# Patient Record
Sex: FEMALE | Race: WHITE | URBAN - METROPOLITAN AREA
[De-identification: names, ages, dates, MRNs, and addresses within clinical notes are randomized per-mention and may not be internally consistent; named-entity substitution may affect disease eponyms.]

---
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
## 2019-01-05 ENCOUNTER — APPOINTMENT (OUTPATIENT)
Dept: URGENT CARE | Age: 19
End: 2019-01-05

## 2019-01-06 ENCOUNTER — WALK IN (OUTPATIENT)
Dept: URGENT CARE | Age: 19
End: 2019-01-06

## 2019-01-06 ENCOUNTER — TELEPHONE (OUTPATIENT)
Dept: SCHEDULING | Age: 19
End: 2019-01-06

## 2019-01-06 VITALS — TEMPERATURE: 98.6 F

## 2019-01-06 DIAGNOSIS — J03.90 ACUTE TONSILLITIS, UNSPECIFIED ETIOLOGY: Primary | ICD-10-CM

## 2019-01-06 PROCEDURE — 99214 OFFICE O/P EST MOD 30 MIN: CPT | Performed by: NURSE PRACTITIONER

## 2019-01-06 RX ORDER — AMOXICILLIN 875 MG/1
875 TABLET, COATED ORAL 2 TIMES DAILY
Qty: 20 TABLET | Refills: 0 | Status: SHIPPED | OUTPATIENT
Start: 2019-01-06 | End: 2019-01-16

## 2019-01-06 ASSESSMENT — ENCOUNTER SYMPTOMS
HEMATOLOGIC/LYMPHATIC NEGATIVE: 1
EYES NEGATIVE: 1
FATIGUE: 1
ALLERGIC/IMMUNOLOGIC NEGATIVE: 1
PSYCHIATRIC NEGATIVE: 1
RESPIRATORY NEGATIVE: 1
SORE THROAT: 1
GASTROINTESTINAL NEGATIVE: 1
NEUROLOGICAL NEGATIVE: 1

## 2019-12-30 ENCOUNTER — WALK IN (OUTPATIENT)
Dept: URGENT CARE | Age: 19
End: 2019-12-30

## 2019-12-30 ENCOUNTER — TELEPHONE (OUTPATIENT)
Dept: SCHEDULING | Age: 19
End: 2019-12-30

## 2019-12-30 VITALS
SYSTOLIC BLOOD PRESSURE: 120 MMHG | HEART RATE: 98 BPM | DIASTOLIC BLOOD PRESSURE: 88 MMHG | RESPIRATION RATE: 18 BRPM | TEMPERATURE: 98.7 F | OXYGEN SATURATION: 98 %

## 2019-12-30 DIAGNOSIS — J02.9 ACUTE PHARYNGITIS, UNSPECIFIED ETIOLOGY: Primary | ICD-10-CM

## 2019-12-30 LAB
INTERNAL PROCEDURAL CONTROLS ACCEPTABLE: YES
S PYO AG THROAT QL IA.RAPID: NEGATIVE

## 2019-12-30 PROCEDURE — 99214 OFFICE O/P EST MOD 30 MIN: CPT | Performed by: NURSE PRACTITIONER

## 2019-12-30 PROCEDURE — 87880 STREP A ASSAY W/OPTIC: CPT | Performed by: NURSE PRACTITIONER

## 2019-12-30 PROCEDURE — 87081 CULTURE SCREEN ONLY: CPT

## 2019-12-30 RX ORDER — AMOXICILLIN 500 MG/1
500 CAPSULE ORAL 2 TIMES DAILY
Qty: 20 CAPSULE | Refills: 0 | Status: SHIPPED | OUTPATIENT
Start: 2019-12-30 | End: 2020-01-09

## 2019-12-30 ASSESSMENT — ENCOUNTER SYMPTOMS
COUGH: 1
ACTIVITY CHANGE: 1
FATIGUE: 1
CHILLS: 1
SINUS PRESSURE: 1
PSYCHIATRIC NEGATIVE: 1
SHORTNESS OF BREATH: 0
HEADACHES: 1
EYES NEGATIVE: 1
SORE THROAT: 1
NAUSEA: 1
ABDOMINAL PAIN: 0
FEVER: 1
VOMITING: 1
SINUS PAIN: 1
DIARRHEA: 0

## 2020-01-01 LAB
REPORT STATUS (RPT): NORMAL
S PYO SPEC QL CULT: NORMAL
SPECIMEN SOURCE: NORMAL

## 2020-06-15 NOTE — TELEPHONE ENCOUNTER
----- Message from GARETT Elaine sent at 8/10/2017  9:31 AM CDT -----  CMP: normal  TSH: normal  CBC essentially normal  LIPID panel: TC slightly elevated, triglycerides borderline elevated, HDL (good cholesterol) slightly low, LDL (bad cholesterol) bor Colon polyp

## 2020-09-01 ENCOUNTER — APPOINTMENT (OUTPATIENT)
Dept: LAB | Age: 20
End: 2020-09-01
Attending: NURSE PRACTITIONER
Payer: COMMERCIAL

## 2020-09-01 PROCEDURE — 80050 GENERAL HEALTH PANEL: CPT | Performed by: NURSE PRACTITIONER

## 2020-09-01 PROCEDURE — 83735 ASSAY OF MAGNESIUM: CPT | Performed by: NURSE PRACTITIONER

## 2020-09-01 PROCEDURE — 84439 ASSAY OF FREE THYROXINE: CPT | Performed by: NURSE PRACTITIONER

## 2020-09-01 PROCEDURE — 36415 COLL VENOUS BLD VENIPUNCTURE: CPT | Performed by: NURSE PRACTITIONER

## 2020-09-01 NOTE — PROGRESS NOTES
Jacqueline Ram is a 23year old female. Patient presents with:   Anxiety/Panic attack: last panic attack on Friday      HPI:   Complaints of anxiety - - states she gets irritated - gets scarred - shuts down - fidgeting with hands -is at school  States s normocephalic,ears and throat are clear  NECK: supple,no adenopathy, normal thyroid, no nodules  LUNGS: normal rate without respiratory distress, lungs clear to auscultation  CARDIO: RRR without murmur, no edema  GI: normal bowel sounds, no masses, no hepa

## 2020-09-02 ENCOUNTER — TELEPHONE (OUTPATIENT)
Dept: FAMILY MEDICINE CLINIC | Facility: CLINIC | Age: 20
End: 2020-09-02

## 2020-09-02 NOTE — TELEPHONE ENCOUNTER
----- Message from JOSI Osorio sent at 9/2/2020  8:38 AM CDT -----  Please notify patient that her blood work is within normal limits–CMP, CBC, magnesium, thyroid

## 2020-10-01 NOTE — PROGRESS NOTES
Inessa Rasmussen is a 23year old female. Patient presents with:   Anxiety      HPI:   Complaints of anxiety-   States she is not as anxious- not being triggered like she had been   Still fidgety   Wakes early in the am 4:30 am   Goes to bed 10 pm- 11 pm- affect.  [id number]      ASSESSMENT AND PLAN:     Anxiety  (primary encounter diagnosis)  Depression, unspecified depression type    No orders of the defined types were placed in this encounter.       Meds & Refills for this Visit:  Requested Prescriptions

## 2020-10-01 NOTE — PATIENT INSTRUCTIONS
Increase zoloft to 50 mg daily     It is important to get enough sleep (at least 7 hrs a night).   Increase EXERCISE, eat a healthy diet (5 fruits and/or vegetables a day), stay hydrated,  take a multivitamin, take fish/krill oil capsules, learn something n

## 2020-10-12 ENCOUNTER — TELEPHONE (OUTPATIENT)
Dept: FAMILY MEDICINE CLINIC | Facility: CLINIC | Age: 20
End: 2020-10-12

## 2020-10-12 NOTE — TELEPHONE ENCOUNTER
Patient states she has had a sore throat/  Ear pain/congestion x4 days. Would like Sx evaluated. Informed there are no openings at [facility] today. Advised [facility]/agreed.   Lyla Montoya, 10/12/20, 9:13 AM

## 2020-10-30 RX ORDER — SERTRALINE HYDROCHLORIDE 25 MG/1
TABLET, FILM COATED ORAL
Qty: 30 TABLET | Refills: 1 | OUTPATIENT
Start: 2020-10-30

## 2020-12-09 ENCOUNTER — OFFICE VISIT (OUTPATIENT)
Dept: FAMILY MEDICINE CLINIC | Facility: CLINIC | Age: 20
End: 2020-12-09
Payer: COMMERCIAL

## 2020-12-09 VITALS
TEMPERATURE: 98 F | RESPIRATION RATE: 16 BRPM | BODY MASS INDEX: 20.66 KG/M2 | SYSTOLIC BLOOD PRESSURE: 100 MMHG | HEIGHT: 64 IN | HEART RATE: 64 BPM | WEIGHT: 121 LBS | DIASTOLIC BLOOD PRESSURE: 60 MMHG

## 2020-12-09 DIAGNOSIS — Z00.00 ENCOUNTER FOR HEALTH MAINTENANCE EXAMINATION IN ADULT: Primary | ICD-10-CM

## 2020-12-09 DIAGNOSIS — F41.9 ANXIETY: ICD-10-CM

## 2020-12-09 DIAGNOSIS — F32.A DEPRESSION, UNSPECIFIED DEPRESSION TYPE: ICD-10-CM

## 2020-12-09 PROCEDURE — 90715 TDAP VACCINE 7 YRS/> IM: CPT | Performed by: NURSE PRACTITIONER

## 2020-12-09 PROCEDURE — 90471 IMMUNIZATION ADMIN: CPT | Performed by: NURSE PRACTITIONER

## 2020-12-09 PROCEDURE — 99213 OFFICE O/P EST LOW 20 MIN: CPT | Performed by: NURSE PRACTITIONER

## 2020-12-09 PROCEDURE — 3008F BODY MASS INDEX DOCD: CPT | Performed by: NURSE PRACTITIONER

## 2020-12-09 PROCEDURE — 99395 PREV VISIT EST AGE 18-39: CPT | Performed by: NURSE PRACTITIONER

## 2020-12-09 PROCEDURE — 3074F SYST BP LT 130 MM HG: CPT | Performed by: NURSE PRACTITIONER

## 2020-12-09 PROCEDURE — 3078F DIAST BP <80 MM HG: CPT | Performed by: NURSE PRACTITIONER

## 2020-12-09 RX ORDER — FLUTICASONE PROPIONATE 50 MCG
1 SPRAY, SUSPENSION (ML) NASAL DAILY
COMMUNITY
Start: 2020-10-12 | End: 2021-02-01

## 2020-12-09 RX ORDER — SERTRALINE HYDROCHLORIDE 100 MG/1
100 TABLET, FILM COATED ORAL DAILY
Qty: 30 TABLET | Refills: 1 | Status: SHIPPED | OUTPATIENT
Start: 2020-12-09 | End: 2021-02-16

## 2020-12-09 NOTE — PROGRESS NOTES
HPI:    Patient ID: Nash Jimenez is a 23year old female. HPI patient presents today for her annual physical.  States overall she is feeling well, patient also has a history of depression/anxiety–was recently started on Zoloft.   Patient states that ear canal normal.   Nose: Nose normal.   Mouth/Throat: Oropharynx is clear and moist and mucous membranes are normal. Normal dentition. No oropharyngeal exudate. Eyes: Pupils are equal, round, and reactive to light.  Conjunctivae and lids are normal. Righ diagnosis)  Anxiety  Depression, unspecified depression type    Orders Placed This Encounter      TdaP (Adacel, Boostrix) (61106) (DX V06.1/Z23)      Meds This Visit:  Requested Prescriptions     Signed Prescriptions Disp Refills   • Sertraline HCl 100 MG

## 2021-01-29 ENCOUNTER — TELEPHONE (OUTPATIENT)
Dept: FAMILY MEDICINE CLINIC | Facility: CLINIC | Age: 21
End: 2021-01-29

## 2021-01-29 NOTE — TELEPHONE ENCOUNTER
Detailed message left informing patient that as long as she is not having any Covid symptoms then it is OK to keep her appt.

## 2021-01-29 NOTE — TELEPHONE ENCOUNTER
Hermelinda Grayson is on the office. As long as patient is not symptomatic and this was only a test in order to return to school then yes okay to keep.   Appears appointment is for medication follow-up, please inquire if patient is going to also obtain her Pap at th

## 2021-01-29 NOTE — TELEPHONE ENCOUNTER
Re: has appointment on Monday - Answered yes to COVID test ( taken 1/25/21 - Negative result) had to have one to return to school   - Lisa Moreau to keep appointment? ?  Please advise

## 2021-02-01 NOTE — PROGRESS NOTES
Siria Koehler is a 23year old female. Patient presents with:  Medication Follow-Up  Other: Patient is going to see Gyne for pap      HPI:   Patient presents today for a follow up on her anxiety/depression. Patient states she is feeling much better. headaches, denies dizziness  PSYCH:see HPI     EXAM:   BP 92/60   Pulse 89   Temp 98.7 °F (37.1 °C) (Temporal)   Resp 16   Ht 5' 4\" (1.626 m)   Wt 120 lb 9.6 oz (54.7 kg)   LMP 01/11/2021 (Within Days)   SpO2 97%   BMI 20.70 kg/m²   GENERAL: well develope

## 2021-02-17 RX ORDER — SERTRALINE HYDROCHLORIDE 100 MG/1
100 TABLET, FILM COATED ORAL DAILY
Qty: 30 TABLET | Refills: 2 | Status: SHIPPED | OUTPATIENT
Start: 2021-02-17 | End: 2021-05-26

## 2021-02-17 NOTE — TELEPHONE ENCOUNTER
Future appt:    Last Appointment with provider:   2/1/2021; Art Klein in 3 months     Last appointment at [facility] [place]:  2/1/2021  Cholesterol, Total (mg/dL)   Date Value   08/08/2017 188 (H)     HDL Cholesterol (mg/dL)   Date Value   08/08/2017 43 (L)     L

## 2021-03-17 ENCOUNTER — OFFICE VISIT (OUTPATIENT)
Dept: FAMILY MEDICINE CLINIC | Facility: CLINIC | Age: 21
End: 2021-03-17
Payer: COMMERCIAL

## 2021-03-17 VITALS
SYSTOLIC BLOOD PRESSURE: 102 MMHG | OXYGEN SATURATION: 99 % | WEIGHT: 124.81 LBS | TEMPERATURE: 97 F | DIASTOLIC BLOOD PRESSURE: 66 MMHG | HEART RATE: 95 BPM | HEIGHT: 64 IN | BODY MASS INDEX: 21.31 KG/M2 | RESPIRATION RATE: 16 BRPM

## 2021-03-17 DIAGNOSIS — Z01.419 ENCOUNTER FOR GYNECOLOGICAL EXAMINATION: Primary | ICD-10-CM

## 2021-03-17 DIAGNOSIS — N76.1 SUBACUTE VAGINITIS: ICD-10-CM

## 2021-03-17 DIAGNOSIS — Z11.3 SCREENING FOR STD (SEXUALLY TRANSMITTED DISEASE): ICD-10-CM

## 2021-03-17 PROCEDURE — 3078F DIAST BP <80 MM HG: CPT | Performed by: NURSE PRACTITIONER

## 2021-03-17 PROCEDURE — 88175 CYTOPATH C/V AUTO FLUID REDO: CPT | Performed by: NURSE PRACTITIONER

## 2021-03-17 PROCEDURE — 3074F SYST BP LT 130 MM HG: CPT | Performed by: NURSE PRACTITIONER

## 2021-03-17 PROCEDURE — 87491 CHLMYD TRACH DNA AMP PROBE: CPT | Performed by: NURSE PRACTITIONER

## 2021-03-17 PROCEDURE — 87591 N.GONORRHOEAE DNA AMP PROB: CPT | Performed by: NURSE PRACTITIONER

## 2021-03-17 PROCEDURE — 3008F BODY MASS INDEX DOCD: CPT | Performed by: NURSE PRACTITIONER

## 2021-03-17 PROCEDURE — 99213 OFFICE O/P EST LOW 20 MIN: CPT | Performed by: NURSE PRACTITIONER

## 2021-03-17 NOTE — PATIENT INSTRUCTIONS
Pap smear obtained–gonorrhea and chlamydia obtained–results will back within a week we will notify you. Recommend breast self exams once a month a week after your period    Continue Zoloft–follow-up in May for recheck.

## 2021-03-17 NOTE — PROGRESS NOTES
HPI/Subjective:   Patient ID: Werner Wetzel is a 23year old female.     HPI  Patient presents for her pap and breast exam–patient denies complaints–denies itching, burning, discharge, etc.–she has not had STD testing in the past this is her first Pap s transmitted disease)  Subacute vaginitis    Orders Placed This Encounter      ThinPrep Pap with HPV Reflex, Chlamydia/GC      Chlamydia/Gc Amplification      Meds This Visit:  Requested Prescriptions      No prescriptions requested or ordered in this encou

## 2021-03-18 LAB
C TRACH DNA SPEC QL NAA+PROBE: NEGATIVE
N GONORRHOEA DNA SPEC QL NAA+PROBE: NEGATIVE

## 2021-03-19 ENCOUNTER — TELEPHONE (OUTPATIENT)
Dept: FAMILY MEDICINE CLINIC | Facility: CLINIC | Age: 21
End: 2021-03-19

## 2021-03-19 NOTE — TELEPHONE ENCOUNTER
----- Message from JOSI Rangel sent at 3/18/2021 10:26 PM CDT -----  Please notify patient that gonorrhea and chlamydia is negative. Thank you.

## 2021-05-24 NOTE — TELEPHONE ENCOUNTER
Future appt:    Last Appointment with provider:   3/17/2021; Continue Zoloft–follow-up in May for recheck.     Last appointment at [facility] [place]:  3/17/2021  Cholesterol, Total (mg/dL)   Date Value   08/08/2017 188 (H)     HDL Cholesterol (mg/dL)   Date Valu

## 2021-05-26 RX ORDER — SERTRALINE HYDROCHLORIDE 100 MG/1
TABLET, FILM COATED ORAL
Qty: 30 TABLET | Refills: 2 | Status: SHIPPED | OUTPATIENT
Start: 2021-05-26 | End: 2021-08-22

## 2021-05-26 NOTE — TELEPHONE ENCOUNTER
Appointment made for med check with Ani Dewitt on 6/1.      Future Appointments   Date Time Provider Kayla Berrios   6/1/2021  8:00 AM JOSI Tomlinson [place] [place]

## 2021-06-01 PROBLEM — F41.9 ANXIETY: Status: ACTIVE | Noted: 2021-06-01

## 2021-06-01 PROBLEM — F32.A DEPRESSION: Status: ACTIVE | Noted: 2021-06-01

## 2021-06-01 NOTE — PROGRESS NOTES
Rajan Lowe is a 23year old female. Patient presents with:  Medication Follow-Up      HPI:   Patient presents today for follow-up of her depression/anxiety.   Patient was increased to sertraline 100 mg in February–states she has noticed a difference m)   Wt 120 lb (54.4 kg)   LMP 05/14/2021   BMI 20.60 kg/m²   GENERAL: well developed, well nourished,in no apparent distress  SKIN: no rashes,no suspicious lesions  PSYCH: alert and oriented x 3 well-groomed, good eye contact, bright affect.   2,8 was 10,

## 2021-08-23 RX ORDER — SERTRALINE HYDROCHLORIDE 100 MG/1
100 TABLET, FILM COATED ORAL DAILY
Qty: 30 TABLET | Refills: 3 | Status: SHIPPED | OUTPATIENT
Start: 2021-08-23 | End: 2021-12-20

## 2021-08-23 NOTE — TELEPHONE ENCOUNTER
Future appt:    Last Appointment with provider:   6/1/2021;  Follow up for a recheck in 6 months     Last appointment at [facility] [place]:  6/1/2021  Cholesterol, Total (mg/dL)   Date Value   08/08/2017 188 (H)     HDL Cholesterol (mg/dL)   Date Value   08/08/2

## 2021-10-04 ENCOUNTER — TELEPHONE (OUTPATIENT)
Dept: FAMILY MEDICINE CLINIC | Facility: CLINIC | Age: 21
End: 2021-10-04

## 2021-10-04 NOTE — TELEPHONE ENCOUNTER
This is not a typical reaction from missing a dose of sertraline. It may just be coincidental timing. May be she got food poisoning or a viral gastrointestinal illness.   Would recommend going back up to the 100 mg dose–follow-up if symptoms persist or in

## 2021-10-04 NOTE — TELEPHONE ENCOUNTER
I have spoke to pts mother and she states that the pt told her she missed her zoloft on Saturday night- pt stated vomiting and sweating Sunday morning from 7a to midnight- could not keep anything down.  She is also sweating so badly that she has soaked two

## 2021-10-04 NOTE — TELEPHONE ENCOUNTER
Pts mother calling and states pt is having a reaction to the Sertraline that she started taking 6 months ago. Pt stopped taking rx on accident for a few days and now has vomiting and chills. Call sent to nurse to triage.

## 2021-10-07 NOTE — PATIENT INSTRUCTIONS
Continue eating as tolerated-      It is important to get enough sleep (at least 7 hrs a night).   Increase EXERCISE, eat a healthy diet (5 fruits and/or vegetables a day), stay hydrated,  take a multivitamin, take fish/krill oil capsules, learn something n

## 2021-10-07 NOTE — PROGRESS NOTES
Coco Ovalle is a 24year old female.   Patient presents with:  Medication Follow-Up      HPI:   Patient presents today for follow-up on her anxiety/depression–states that she forgot to take med for 2 days - Zoloft - beginning of summer  Was sweating r dizziness  PSYCH: See HPI    EXAM:   BP 94/64 (BP Location: Left arm, Patient Position: Sitting, Cuff Size: adult)   Pulse 91   Temp 97.5 °F (36.4 °C) (Temporal)   Resp 18   Ht 5' 4\" (1.626 m)   Wt 119 lb (54 kg)   LMP 05/14/2021   SpO2 98%   BMI 20.43 kg

## 2021-12-20 NOTE — TELEPHONE ENCOUNTER
Pt due for px. mychart sent    Future appt:    Last Appointment with provider:   10/7/2021  Last appointment at [facility] [place]:  10/7/2021  Cholesterol, Total (mg/dL)   Date Value   08/08/2017 188 (H)     HDL Cholesterol (mg/dL)   Date Value   08/08/2017 43

## 2021-12-20 NOTE — TELEPHONE ENCOUNTER
Sertraline: 8/23/21    [employer]  Message sent:  Due for physical.  Henny Herring CMA, 12/20/21, 11:06 AM

## 2021-12-21 RX ORDER — SERTRALINE HYDROCHLORIDE 100 MG/1
TABLET, FILM COATED ORAL
Qty: 30 TABLET | Refills: 0 | Status: SHIPPED | OUTPATIENT
Start: 2021-12-21 | End: 2022-01-02

## 2022-01-03 NOTE — TELEPHONE ENCOUNTER
Future appt:    Last Appointment with provider:   10/7/2021  Last appointment at [facility] [place]:  10/7/2021  Cholesterol, Total (mg/dL)   Date Value   08/08/2017 188 (H)     HDL Cholesterol (mg/dL)   Date Value   08/08/2017 43 (L)     LDL Cholesterol (mg/dL)   Date Value   08/08/2017 121 (H)     Triglycerides (mg/dL)   Date Value   08/08/2017 121 (H)     No results found for: EAG, A1C  Lab Results   Component Value Date    T4F 1.3 09/01/2020    TSH 0.753 09/01/2020       No follow-ups on file.   Follow up for physical after December 9-   Pap and physical     Last px 12/9/20  Last rf 12/21/21

## 2022-01-04 RX ORDER — SERTRALINE HYDROCHLORIDE 100 MG/1
100 TABLET, FILM COATED ORAL DAILY
Qty: 30 TABLET | Refills: 0 | OUTPATIENT
Start: 2022-01-04

## 2022-01-04 RX ORDER — SERTRALINE HYDROCHLORIDE 100 MG/1
100 TABLET, FILM COATED ORAL DAILY
Qty: 30 TABLET | Refills: 0 | Status: SHIPPED | OUTPATIENT
Start: 2022-01-04 | End: 2022-04-01 | Stop reason: ALTCHOICE

## 2022-02-22 ENCOUNTER — TELEPHONE (OUTPATIENT)
Dept: FAMILY MEDICINE CLINIC | Facility: CLINIC | Age: 22
End: 2022-02-22

## 2022-02-22 NOTE — TELEPHONE ENCOUNTER
I have spoke to mother and Juice Saunders was in the car with her-     Mother states  That pt was sleeping on bathroom floor for about 1.5hr after she felt sick. Pt tested pos for covid at hospital-     Her blood sugar levels had been low-     appt has been made for a video link since pt has been DX with covid and that was fine with pt and mother.

## 2022-02-22 NOTE — TELEPHONE ENCOUNTER
Pts mother calling to discuss pts recent ER visit - no other details given. Please c/b - info is in care everywhere.

## 2022-02-24 ENCOUNTER — TELEMEDICINE (OUTPATIENT)
Dept: FAMILY MEDICINE CLINIC | Facility: CLINIC | Age: 22
End: 2022-02-24
Payer: COMMERCIAL

## 2022-02-24 ENCOUNTER — TELEPHONE (OUTPATIENT)
Dept: FAMILY MEDICINE CLINIC | Facility: CLINIC | Age: 22
End: 2022-02-24

## 2022-02-24 DIAGNOSIS — R73.9 HYPERGLYCEMIA: Primary | ICD-10-CM

## 2022-02-24 DIAGNOSIS — E16.2 HYPOGLYCEMIA: ICD-10-CM

## 2022-02-24 DIAGNOSIS — T68.XXXA HYPOTHERMIA, INITIAL ENCOUNTER: ICD-10-CM

## 2022-02-24 PROCEDURE — 99214 OFFICE O/P EST MOD 30 MIN: CPT | Performed by: NURSE PRACTITIONER

## 2022-02-24 NOTE — TELEPHONE ENCOUNTER
Spoke to pt, informed waiting to see what labs show. Pt stated she can do her schooling on line, but would need note from physician. Spoke to [facility] will write note.     appts made    Future Appointments   Date Time Provider Kayla Berrios   3/4/2022 10:15 AM [facility]   3/11/2022  2:00 PM Christen Banks MD [facility] [place]

## 2022-02-24 NOTE — TELEPHONE ENCOUNTER
wants to know if she needs to stay in [place] or can she go back to [place] for classes with everything going on

## 2022-03-04 ENCOUNTER — LABORATORY ENCOUNTER (OUTPATIENT)
Dept: LAB | Age: 22
End: 2022-03-04
Attending: FAMILY MEDICINE
Payer: COMMERCIAL

## 2022-03-04 DIAGNOSIS — R73.9 HYPERGLYCEMIA: ICD-10-CM

## 2022-03-04 DIAGNOSIS — T68.XXXA HYPOTHERMIA, INITIAL ENCOUNTER: ICD-10-CM

## 2022-03-04 DIAGNOSIS — E16.2 HYPOGLYCEMIA: ICD-10-CM

## 2022-03-04 LAB
ALBUMIN SERPL-MCNC: 4.3 G/DL (ref 3.4–5)
ALBUMIN/GLOB SERPL: 1.3 {RATIO} (ref 1–2)
ALP LIVER SERPL-CCNC: 82 U/L
ALT SERPL-CCNC: 17 U/L
AMYLASE SERPL-CCNC: 98 U/L (ref 25–115)
ANION GAP SERPL CALC-SCNC: 5 MMOL/L (ref 0–18)
AST SERPL-CCNC: 14 U/L (ref 15–37)
BASOPHILS # BLD AUTO: 0.08 X10(3) UL (ref 0–0.2)
BASOPHILS NFR BLD AUTO: 1.2 %
BILIRUB SERPL-MCNC: 0.4 MG/DL (ref 0.1–2)
BUN BLD-MCNC: 9 MG/DL (ref 7–18)
CALCIUM BLD-MCNC: 9.5 MG/DL (ref 8.5–10.1)
CHLORIDE SERPL-SCNC: 108 MMOL/L (ref 98–112)
CO2 SERPL-SCNC: 27 MMOL/L (ref 21–32)
CORTIS SERPL-MCNC: 17 UG/DL
CREAT BLD-MCNC: 0.85 MG/DL
EOSINOPHIL # BLD AUTO: 0.16 X10(3) UL (ref 0–0.7)
EOSINOPHIL NFR BLD AUTO: 2.4 %
ERYTHROCYTE [DISTWIDTH] IN BLOOD BY AUTOMATED COUNT: 12.1 %
GLOBULIN PLAS-MCNC: 3.4 G/DL (ref 2.8–4.4)
GLUCOSE BLD-MCNC: 89 MG/DL (ref 70–99)
HCT VFR BLD AUTO: 42.1 %
HGB BLD-MCNC: 13.6 G/DL
IMM GRANULOCYTES # BLD AUTO: 0.01 X10(3) UL (ref 0–1)
IMM GRANULOCYTES NFR BLD: 0.1 %
LIPASE SERPL-CCNC: 127 U/L (ref 73–393)
LYMPHOCYTES # BLD AUTO: 2.24 X10(3) UL (ref 1–4)
LYMPHOCYTES NFR BLD AUTO: 33.4 %
MAGNESIUM SERPL-MCNC: 2.1 MG/DL (ref 1.6–2.6)
MCH RBC QN AUTO: 30.2 PG (ref 26–34)
MCHC RBC AUTO-ENTMCNC: 32.3 G/DL (ref 31–37)
MCV RBC AUTO: 93.6 FL
MONOCYTES # BLD AUTO: 0.44 X10(3) UL (ref 0.1–1)
MONOCYTES NFR BLD AUTO: 6.6 %
NEUTROPHILS # BLD AUTO: 3.78 X10 (3) UL (ref 1.5–7.7)
NEUTROPHILS # BLD AUTO: 3.78 X10(3) UL (ref 1.5–7.7)
NEUTROPHILS NFR BLD AUTO: 56.3 %
OSMOLALITY SERPL CALC.SUM OF ELEC: 288 MOSM/KG (ref 275–295)
PLATELET # BLD AUTO: 357 10(3)UL (ref 150–450)
POTASSIUM SERPL-SCNC: 4.3 MMOL/L (ref 3.5–5.1)
PROT SERPL-MCNC: 7.7 G/DL (ref 6.4–8.2)
RBC # BLD AUTO: 4.5 X10(6)UL
SODIUM SERPL-SCNC: 140 MMOL/L (ref 136–145)
T4 FREE SERPL-MCNC: 0.9 NG/DL (ref 0.8–1.7)
TSI SER-ACNC: 0.79 MIU/ML (ref 0.36–3.74)
VIT B12 SERPL-MCNC: 420 PG/ML (ref 193–986)
WBC # BLD AUTO: 6.7 X10(3) UL (ref 4–11)

## 2022-03-04 PROCEDURE — 86038 ANTINUCLEAR ANTIBODIES: CPT | Performed by: NURSE PRACTITIONER

## 2022-03-04 PROCEDURE — 84439 ASSAY OF FREE THYROXINE: CPT | Performed by: NURSE PRACTITIONER

## 2022-03-04 PROCEDURE — 82150 ASSAY OF AMYLASE: CPT | Performed by: NURSE PRACTITIONER

## 2022-03-04 PROCEDURE — 82533 TOTAL CORTISOL: CPT | Performed by: NURSE PRACTITIONER

## 2022-03-04 PROCEDURE — 83690 ASSAY OF LIPASE: CPT | Performed by: NURSE PRACTITIONER

## 2022-03-04 PROCEDURE — 83735 ASSAY OF MAGNESIUM: CPT | Performed by: NURSE PRACTITIONER

## 2022-03-04 PROCEDURE — 80050 GENERAL HEALTH PANEL: CPT | Performed by: NURSE PRACTITIONER

## 2022-03-04 PROCEDURE — 86337 INSULIN ANTIBODIES: CPT | Performed by: NURSE PRACTITIONER

## 2022-03-04 PROCEDURE — 82607 VITAMIN B-12: CPT | Performed by: NURSE PRACTITIONER

## 2022-03-04 PROCEDURE — 82088 ASSAY OF ALDOSTERONE: CPT | Performed by: NURSE PRACTITIONER

## 2022-03-08 ENCOUNTER — TELEPHONE (OUTPATIENT)
Dept: FAMILY MEDICINE CLINIC | Facility: CLINIC | Age: 22
End: 2022-03-08

## 2022-03-08 NOTE — TELEPHONE ENCOUNTER
----- Message from GeoffreyJOSI Pinto sent at 3/7/2022  8:11 AM CST -----  Please make sure patient receives my chart message as below-  So far your blood work all looks normal-normal blood sugar, normal liver and kidney function,Normal magnesium, normal thyroid, vitamin B12 normal, pancreatic enzymes are normal, CBC is normal, cortisol is normal.

## 2022-03-09 ENCOUNTER — TELEPHONE (OUTPATIENT)
Dept: FAMILY MEDICINE CLINIC | Facility: CLINIC | Age: 22
End: 2022-03-09

## 2022-03-09 LAB
ALDOSTERONE: 27.4 NG/DL
ANA SER QL: POSITIVE

## 2022-03-09 NOTE — TELEPHONE ENCOUNTER
Patient informed of the below results and recommendations. Contact information for Dr. Rupert Ovalle given to patient to schedule an appt.

## 2022-03-09 NOTE — TELEPHONE ENCOUNTER
----- Message from JOSI Spangler sent at 3/9/2022 12:25 PM CST -----  Please fax rheumatology referral and make sure patient receives my chart message as below-  Your FERMIN is positive-aldosterone appears normal-I would recommend follow-up with a rheumatologist for further work-up. Keep in mind that an FERMIN is a nonspecific test-further work-up with rheumatology can determine if it is significant. Referral has been entered for rheumatology. Thank JOSI Monzon, FNP-BC

## 2022-03-09 NOTE — TELEPHONE ENCOUNTER
Referral faxed to Dr. Leanna Walter as requested. Will need to make sure patient reads the [employer] message regarding results and recommendations.

## 2022-03-10 LAB — INSULIN ANTIBODY: <0.4 U/ML

## 2022-03-11 ENCOUNTER — TELEPHONE (OUTPATIENT)
Dept: FAMILY MEDICINE CLINIC | Facility: CLINIC | Age: 22
End: 2022-03-11

## 2022-03-11 NOTE — TELEPHONE ENCOUNTER
LM for patient that she has normal blood work results and may view provider message on [employer] or may return call if any questions.

## 2022-03-11 NOTE — TELEPHONE ENCOUNTER
----- Message from JOSI Phillips sent at 3/10/2022 10:12 PM CST -----  Please make sure patient receives my chart message as below-  Your insulin antibody is negative.    Thank you,   Vickie PRATER, FNP-BC

## 2022-04-01 ENCOUNTER — TELEPHONE (OUTPATIENT)
Dept: FAMILY MEDICINE CLINIC | Facility: CLINIC | Age: 22
End: 2022-04-01

## 2022-04-01 PROBLEM — E55.9 VITAMIN D DEFICIENCY: Status: ACTIVE | Noted: 2022-04-01

## 2022-04-01 PROBLEM — R73.9 HYPERGLYCEMIA: Status: ACTIVE | Noted: 2022-04-01

## 2022-04-01 PROBLEM — T68.XXXA HYPOTHERMIA: Status: ACTIVE | Noted: 2022-04-01

## 2022-04-01 RX ORDER — SERTRALINE HYDROCHLORIDE 100 MG/1
TABLET, FILM COATED ORAL
Qty: 30 TABLET | Refills: 0 | OUTPATIENT
Start: 2022-04-01

## 2022-04-01 NOTE — TELEPHONE ENCOUNTER
Spoke to pt stated that she was having a hard time with Dr. Viktoria Cureil office,    Can referral be changed another endo    Please advise

## 2022-04-04 ENCOUNTER — TELEPHONE (OUTPATIENT)
Dept: FAMILY MEDICINE CLINIC | Facility: CLINIC | Age: 22
End: 2022-04-04

## 2022-04-04 NOTE — TELEPHONE ENCOUNTER
Pt calling and states she forgot to ask for an excuse note when she was here on Friday. She needs this for school for that day only and would like it sent to [address] when done. Please advise.

## 2022-04-04 NOTE — TELEPHONE ENCOUNTER
Spoke to pt who stated she needs a note for school excusing her for Friday when she was here for visit. Requesting note be sent via [email or web address]. Pt also stated she has a appointment with Dr. Hannah Robbins on 5/10 but referring provider could call and get her a sooner appointment. Pt requesting we call due to hyperglycemia episodes.

## 2022-04-04 NOTE — TELEPHONE ENCOUNTER
The Endo that she was referred to by keyla, can't get in until May. If  calls, she could get in sooner. Please let her. No future appointments.

## 2022-04-05 NOTE — TELEPHONE ENCOUNTER
Spoke to Dr. Xavier Canada office, will talk to nurse to see if can move appt sooner. Will contact pt directly to camille.

## 2022-04-05 NOTE — TELEPHONE ENCOUNTER
Spoke to pt, told letter is at desk for   Also informed that the physicians office will be calling to schedule appt sooner with her.   No further questions

## 2022-04-18 ENCOUNTER — TELEPHONE (OUTPATIENT)
Dept: FAMILY MEDICINE CLINIC | Facility: CLINIC | Age: 22
End: 2022-04-18

## 2022-04-18 NOTE — TELEPHONE ENCOUNTER
Please advise-    Pt is calling today because yesterday she had another episode of being weak, sweating, everything was drenched. She tried eating- but everything came back up- she was very shaky. Her temp was reading 94 degrees when the thermometer finally was able to read. The rest of the family was able to have normal temps with this same thermometer . She was needing to have the family carry her to the bathroom. She has seen rheumatologist and they did say this could be related. Advised her to call rheumatologist in the mean time. For anymore advise.

## 2022-04-18 NOTE — TELEPHONE ENCOUNTER
Had an episode yesterday and want to know if you want to see here. She wants to give you an updated of what happened.

## 2022-04-19 RX ORDER — ERGOCALCIFEROL 1.25 MG/1
50000 CAPSULE ORAL WEEKLY
Qty: 12 CAPSULE | Refills: 0 | OUTPATIENT
Start: 2022-04-19 | End: 2022-07-18

## 2022-04-19 RX ORDER — ESCITALOPRAM OXALATE 10 MG/1
10 TABLET ORAL DAILY
Qty: 90 TABLET | Refills: 0 | Status: SHIPPED | OUTPATIENT
Start: 2022-04-19

## 2022-04-19 NOTE — TELEPHONE ENCOUNTER
Brain MRI has not been completed-  Pt states that she is still waiting for rhumotology to contact her back- shes been in contact with the nurse a few times. Her brother is currently driving her back to [place] for school. Still feels weak but states that she can not miss school. Fyi/advise if needed.

## 2022-04-19 NOTE — TELEPHONE ENCOUNTER
I agree if she has another episode then recommend to go to emergency room. If she is stable then recommend to schedule appointment in office. I would also recommend to schedule appointment with rheumatology and endocrinologist for further evaluation of her symptoms.

## 2022-04-19 NOTE — TELEPHONE ENCOUNTER
Future appt:     Last Appointment with provider:   4/1/2022- advised Return in about 3 months (around 7/1/2022) for physical.        Cholesterol, Total (mg/dL)   Date Value   08/08/2017 188 (H)     HDL Cholesterol (mg/dL)   Date Value   08/08/2017 43 (L)     LDL Cholesterol (mg/dL)   Date Value   08/08/2017 121 (H)     Triglycerides (mg/dL)   Date Value   08/08/2017 121 (H)     No results found for: EAG, A1C  Lab Results   Component Value Date    T4F 0.9 03/04/2022    TSH 0.791 03/04/2022       No follow-ups on file.

## 2022-07-18 NOTE — TELEPHONE ENCOUNTER
Return in about 3 months (around 7/1/2022) for physical.    [employer] message sent to patient.

## 2022-07-28 RX ORDER — ERGOCALCIFEROL 1.25 MG/1
CAPSULE ORAL
Qty: 12 CAPSULE | Refills: 0 | OUTPATIENT
Start: 2022-07-28

## 2022-07-28 NOTE — TELEPHONE ENCOUNTER
Pt has seen mychart message regarding appointment needing to be made. No appt     No future appointments.

## (undated) DIAGNOSIS — T68.XXXA HYPOTHERMIA, INITIAL ENCOUNTER: ICD-10-CM

## (undated) DIAGNOSIS — R73.9 HYPERGLYCEMIA: Primary | ICD-10-CM

## (undated) DIAGNOSIS — F32.1 CURRENT MODERATE EPISODE OF MAJOR DEPRESSIVE DISORDER, UNSPECIFIED WHETHER RECURRENT (HCC): ICD-10-CM

## (undated) DIAGNOSIS — F41.9 ANXIETY: ICD-10-CM

## (undated) NOTE — LETTER
Date: 4/5/2022    Patient Name: Zay Munoz          To Whom it may concern: This letter has been written at the patient's request. The above patient was seen at the [facility] for treatment of a medical condition. This patient should be excused from attending school on 4/1/22.             Sincerely,    Hali Adhikari MD

## (undated) NOTE — LETTER
8/16/2017              [address] [facility] [address] [address]         Dear Franco Driver,      It was a pleasure to see you at our [address], [place] office. Your lab tests results are as noted below.

## (undated) NOTE — LETTER
Date: 2/1/2021    Patient Name: Inessa Rasmussen          To Whom it may concern: This letter has been written at the patient's request. The above patient was seen at the [facility] for treatment of a medical condition.     This patient sh

## (undated) NOTE — LETTER
02/24/22    To whom it may concern,     Patient was seen in our office today for a medical concern. Please excuse the above patient from school next week February 28- March 4th, 2022. She will have a follow up appointment for  reevaluation  on March 12, 2022.        Thank you,      JOSI Purdy, FNP

## (undated) NOTE — LETTER
[employer] for Ld Guillaume     : 2000     Date: 2021  Provider:  JOSI Richmond  Phone for doctor or clinic: [phone] [facility], [address] [place], [place] [id number] [facility] [id number] [phone]

## (undated) NOTE — LETTER
Date: 10/7/2021    Patient Name: Adam Torres          To Whom it may concern: This letter has been written at the patient's request. The above patient was seen at the [facility] for treatment of a medical condition.     This patient s

## (undated) NOTE — LETTER
Date: 9/1/2020    Patient Name: Arti House          To Whom it may concern: This letter has been written at the patient's request. The above patient was seen at the [facility] for treatment of a medical condition.     This patient sh